# Patient Record
Sex: FEMALE | Race: WHITE | NOT HISPANIC OR LATINO | Employment: OTHER | URBAN - METROPOLITAN AREA
[De-identification: names, ages, dates, MRNs, and addresses within clinical notes are randomized per-mention and may not be internally consistent; named-entity substitution may affect disease eponyms.]

---

## 2017-09-27 ENCOUNTER — HOSPITAL ENCOUNTER (OUTPATIENT)
Dept: LAB | Facility: HOSPITAL | Age: 62
Setting detail: SPECIMEN
Discharge: HOME | End: 2017-09-27
Attending: INTERNAL MEDICINE | Admitting: INTERNAL MEDICINE

## 2017-10-04 ENCOUNTER — CONVERSION ENCOUNTER (OUTPATIENT)
Dept: PAIN MEDICINE | Facility: CLINIC | Age: 62
End: 2017-10-04

## 2017-11-10 ENCOUNTER — HOSPITAL ENCOUNTER (OUTPATIENT)
Dept: PAIN MEDICINE | Facility: HOSPITAL | Age: 62
Discharge: HOME | End: 2017-11-10
Attending: PHYSICAL MEDICINE & REHABILITATION | Admitting: PHYSICAL MEDICINE & REHABILITATION

## 2017-11-10 ENCOUNTER — CONVERSION ENCOUNTER (OUTPATIENT)
Dept: PAIN MEDICINE | Facility: CLINIC | Age: 62
End: 2017-11-10

## 2017-12-22 ENCOUNTER — HOSPITAL ENCOUNTER (OUTPATIENT)
Dept: PAIN MEDICINE | Facility: HOSPITAL | Age: 62
Discharge: HOME | End: 2017-12-22
Attending: PHYSICAL MEDICINE & REHABILITATION | Admitting: PHYSICAL MEDICINE & REHABILITATION

## 2017-12-22 ENCOUNTER — CONVERSION ENCOUNTER (OUTPATIENT)
Dept: PAIN MEDICINE | Facility: CLINIC | Age: 62
End: 2017-12-22

## 2018-01-05 ENCOUNTER — CONVERSION ENCOUNTER (OUTPATIENT)
Dept: PAIN MEDICINE | Facility: CLINIC | Age: 63
End: 2018-01-05

## 2018-01-05 ENCOUNTER — HOSPITAL ENCOUNTER (OUTPATIENT)
Dept: PAIN MEDICINE | Facility: HOSPITAL | Age: 63
Discharge: HOME | End: 2018-01-05
Attending: PHYSICAL MEDICINE & REHABILITATION | Admitting: PHYSICAL MEDICINE & REHABILITATION

## 2018-01-19 ENCOUNTER — HOSPITAL ENCOUNTER (OUTPATIENT)
Dept: PAIN MEDICINE | Facility: HOSPITAL | Age: 63
Discharge: HOME | End: 2018-01-19
Attending: PHYSICAL MEDICINE & REHABILITATION | Admitting: PHYSICAL MEDICINE & REHABILITATION

## 2018-01-19 ENCOUNTER — CONVERSION ENCOUNTER (OUTPATIENT)
Dept: PAIN MEDICINE | Facility: CLINIC | Age: 63
End: 2018-01-19

## 2018-09-26 ENCOUNTER — HOSPITAL ENCOUNTER (OUTPATIENT)
Dept: LAB | Facility: HOSPITAL | Age: 63
Setting detail: SPECIMEN
Discharge: HOME | End: 2018-09-26
Attending: INTERNAL MEDICINE | Admitting: INTERNAL MEDICINE

## 2018-10-03 ENCOUNTER — CONVERSION ENCOUNTER (OUTPATIENT)
Dept: PAIN MEDICINE | Facility: CLINIC | Age: 63
End: 2018-10-03

## 2019-06-04 VITALS
RESPIRATION RATE: 16 BRPM | WEIGHT: 150 LBS | DIASTOLIC BLOOD PRESSURE: 88 MMHG | DIASTOLIC BLOOD PRESSURE: 66 MMHG | HEART RATE: 102 BPM | OXYGEN SATURATION: 98 % | HEIGHT: 65 IN | RESPIRATION RATE: 16 BRPM | WEIGHT: 150 LBS | RESPIRATION RATE: 16 BRPM | DIASTOLIC BLOOD PRESSURE: 90 MMHG | SYSTOLIC BLOOD PRESSURE: 120 MMHG | HEART RATE: 92 BPM | BODY MASS INDEX: 24.99 KG/M2 | HEART RATE: 80 BPM | WEIGHT: 150 LBS | DIASTOLIC BLOOD PRESSURE: 93 MMHG | HEIGHT: 64 IN | BODY MASS INDEX: 24.99 KG/M2 | HEIGHT: 64 IN | SYSTOLIC BLOOD PRESSURE: 141 MMHG | HEIGHT: 65 IN | OXYGEN SATURATION: 97 % | RESPIRATION RATE: 16 BRPM | HEART RATE: 76 BPM | SYSTOLIC BLOOD PRESSURE: 138 MMHG | DIASTOLIC BLOOD PRESSURE: 78 MMHG | WEIGHT: 150 LBS | OXYGEN SATURATION: 96 % | WEIGHT: 150 LBS | SYSTOLIC BLOOD PRESSURE: 141 MMHG | HEART RATE: 93 BPM | OXYGEN SATURATION: 98 % | OXYGEN SATURATION: 96 % | OXYGEN SATURATION: 96 % | WEIGHT: 155 LBS | BODY MASS INDEX: 25.61 KG/M2 | HEART RATE: 97 BPM | BODY MASS INDEX: 26.46 KG/M2 | BODY MASS INDEX: 24.99 KG/M2 | HEIGHT: 65 IN | SYSTOLIC BLOOD PRESSURE: 150 MMHG | DIASTOLIC BLOOD PRESSURE: 96 MMHG | SYSTOLIC BLOOD PRESSURE: 130 MMHG

## 2019-07-17 ENCOUNTER — ON CAMPUS - OUTPATIENT (OUTPATIENT)
Dept: RURAL HOSPITAL 3 | Facility: HOSPITAL | Age: 64
End: 2019-07-17

## 2019-07-17 DIAGNOSIS — K57.30 DIVERTICULOSIS OF LARGE INTESTINE WITHOUT PERFORATION OR ABS: ICD-10-CM

## 2019-07-17 DIAGNOSIS — K64.1 SECOND DEGREE HEMORRHOIDS: ICD-10-CM

## 2019-07-17 DIAGNOSIS — Z86.010 PERSONAL HISTORY OF COLONIC POLYPS: ICD-10-CM

## 2019-07-17 DIAGNOSIS — D12.5 BENIGN NEOPLASM OF SIGMOID COLON: ICD-10-CM

## 2019-07-17 DIAGNOSIS — D12.0 BENIGN NEOPLASM OF CECUM: ICD-10-CM

## 2019-07-17 DIAGNOSIS — K63.5 POLYP OF COLON: ICD-10-CM

## 2019-07-17 PROCEDURE — 45385 COLONOSCOPY W/LESION REMOVAL: CPT | Mod: 33 | Performed by: INTERNAL MEDICINE

## 2019-07-17 PROCEDURE — 45380 COLONOSCOPY AND BIOPSY: CPT | Mod: 33,59 | Performed by: INTERNAL MEDICINE

## 2019-09-19 DIAGNOSIS — E89.0 HYPOTHYROIDISM, POSTSURGICAL: Primary | ICD-10-CM

## 2019-09-19 DIAGNOSIS — C73 CARCINOMA OF THYROID GLAND (HCC): ICD-10-CM

## 2019-09-19 PROBLEM — M47.817 OSTEOARTHRITIS OF LUMBOSACRAL SPINE WITHOUT MYELOPATHY: Status: ACTIVE | Noted: 2017-11-10

## 2019-09-19 PROBLEM — M54.17 LUMBOSACRAL RADICULOPATHY: Status: ACTIVE | Noted: 2017-11-10

## 2019-09-19 PROBLEM — G89.29 CHRONIC LOW BACK PAIN: Status: ACTIVE | Noted: 2017-11-10

## 2019-09-19 PROBLEM — M48.061 SPINAL STENOSIS OF LUMBAR REGION: Status: ACTIVE | Noted: 2018-01-19

## 2019-09-19 PROBLEM — M54.50 CHRONIC LOW BACK PAIN: Status: ACTIVE | Noted: 2017-11-10

## 2019-12-02 ENCOUNTER — LAB (OUTPATIENT)
Dept: LAB | Facility: HOSPITAL | Age: 64
End: 2019-12-02

## 2019-12-02 DIAGNOSIS — C73 CARCINOMA OF THYROID GLAND (HCC): ICD-10-CM

## 2019-12-02 DIAGNOSIS — E89.0 HYPOTHYROIDISM, POSTSURGICAL: ICD-10-CM

## 2019-12-02 LAB
T4 FREE SERPL-MCNC: 1.99 NG/DL (ref 0.93–1.7)
TSH SERPL DL<=0.05 MIU/L-ACNC: 0.46 UIU/ML (ref 0.27–4.2)

## 2019-12-02 PROCEDURE — 84439 ASSAY OF FREE THYROXINE: CPT

## 2019-12-02 PROCEDURE — 84443 ASSAY THYROID STIM HORMONE: CPT

## 2019-12-02 PROCEDURE — 36415 COLL VENOUS BLD VENIPUNCTURE: CPT

## 2019-12-03 RX ORDER — LEVOTHYROXINE SODIUM 0.12 MG/1
TABLET ORAL
Qty: 90 TABLET | Refills: 0 | Status: SHIPPED | OUTPATIENT
Start: 2019-12-03 | End: 2019-12-09

## 2019-12-09 ENCOUNTER — OFFICE VISIT (OUTPATIENT)
Dept: ENDOCRINOLOGY | Facility: CLINIC | Age: 64
End: 2019-12-09

## 2019-12-09 VITALS
OXYGEN SATURATION: 94 % | HEART RATE: 92 BPM | DIASTOLIC BLOOD PRESSURE: 86 MMHG | HEIGHT: 64 IN | SYSTOLIC BLOOD PRESSURE: 142 MMHG | WEIGHT: 144 LBS | BODY MASS INDEX: 24.59 KG/M2

## 2019-12-09 DIAGNOSIS — E55.9 VITAMIN D DEFICIENCY: ICD-10-CM

## 2019-12-09 DIAGNOSIS — L65.9 ALOPECIA: ICD-10-CM

## 2019-12-09 DIAGNOSIS — E89.0 HYPOTHYROIDISM, POSTSURGICAL: Primary | ICD-10-CM

## 2019-12-09 PROBLEM — M43.10 DEGENERATIVE SPONDYLOLISTHESIS: Status: ACTIVE | Noted: 2018-02-07

## 2019-12-09 PROBLEM — Z98.1 S/P LUMBAR FUSION: Status: ACTIVE | Noted: 2018-04-26

## 2019-12-09 PROCEDURE — 99213 OFFICE O/P EST LOW 20 MIN: CPT | Performed by: INTERNAL MEDICINE

## 2019-12-09 RX ORDER — VALSARTAN AND HYDROCHLOROTHIAZIDE 160; 12.5 MG/1; MG/1
1 TABLET, FILM COATED ORAL DAILY
Refills: 2 | COMMUNITY
Start: 2019-11-28

## 2019-12-09 RX ORDER — SPIRONOLACTONE 25 MG/1
25 TABLET ORAL 2 TIMES DAILY
Qty: 180 TABLET | Refills: 1 | Status: SHIPPED | OUTPATIENT
Start: 2019-12-09 | End: 2020-06-03

## 2019-12-09 RX ORDER — LEVOTHYROXINE SODIUM 0.12 MG/1
TABLET ORAL
Qty: 90 TABLET | Refills: 3 | Status: SHIPPED | OUTPATIENT
Start: 2019-12-09 | End: 2020-02-24

## 2019-12-09 RX ORDER — EZETIMIBE 10 MG/1
10 TABLET ORAL DAILY
Refills: 0 | COMMUNITY
Start: 2019-10-17 | End: 2022-01-04

## 2019-12-09 RX ORDER — CALCIUM CARBONATE 200(500)MG
1 TABLET,CHEWABLE ORAL DAILY
COMMUNITY
End: 2020-12-09 | Stop reason: SDUPTHER

## 2019-12-09 RX ORDER — FLUTICASONE PROPIONATE 50 MCG
SPRAY, SUSPENSION (ML) NASAL AS NEEDED
Refills: 3 | COMMUNITY
Start: 2019-10-07

## 2019-12-09 RX ORDER — CETIRIZINE HYDROCHLORIDE 10 MG/1
10 TABLET ORAL DAILY
COMMUNITY

## 2019-12-09 RX ORDER — OMEPRAZOLE 20 MG/1
CAPSULE, DELAYED RELEASE ORAL DAILY
COMMUNITY
Start: 2015-10-20

## 2019-12-09 RX ORDER — OLMESARTAN MEDOXOMIL AND HYDROCHLOROTHIAZIDE 40/12.5 40; 12.5 MG/1; MG/1
1 TABLET ORAL DAILY
COMMUNITY
End: 2020-12-09

## 2019-12-09 RX ORDER — TRAMADOL HYDROCHLORIDE 50 MG/1
50 TABLET ORAL AS NEEDED
Refills: 0 | COMMUNITY
Start: 2019-10-15

## 2019-12-09 RX ORDER — OLOPATADINE HYDROCHLORIDE 7 MG/ML
SOLUTION OPHTHALMIC DAILY
Refills: 11 | COMMUNITY
Start: 2019-11-01 | End: 2022-01-04

## 2019-12-09 RX ORDER — BIOTIN 2500 MCG
1 CAPSULE ORAL DAILY
COMMUNITY
Start: 2017-09-27

## 2019-12-09 RX ORDER — MULTIPLE VITAMINS W/ MINERALS TAB 2148-113
TAB ORAL DAILY
COMMUNITY
End: 2020-12-09 | Stop reason: SDUPTHER

## 2019-12-09 RX ORDER — MELOXICAM 15 MG/1
15 TABLET ORAL DAILY
Refills: 2 | COMMUNITY
Start: 2019-11-28

## 2019-12-09 RX ORDER — LINACLOTIDE 145 UG/1
290 CAPSULE, GELATIN COATED ORAL 2 TIMES DAILY
Refills: 3 | COMMUNITY
Start: 2019-11-17 | End: 2022-01-04

## 2019-12-09 RX ORDER — CYCLOBENZAPRINE HCL 10 MG
10 TABLET ORAL 2 TIMES DAILY
Refills: 4 | COMMUNITY
Start: 2019-11-25 | End: 2022-01-04

## 2019-12-09 NOTE — PATIENT INSTRUCTIONS
Continue exercise.  Decrease levothyroxine to one 6d / week.  Spironolactone 25 mg one daily.  If well tolerated, ater 1 week increase to 2 daily  Continue other meds.  F/u in 1y, with labs prior.

## 2019-12-09 NOTE — PROGRESS NOTES
[facility]        Patient Care Team:  Provider, No Known as PCP - General    Chief Complaint:    Chief Complaint   Patient presents with   • Thyroid Problem         Subjective     Here for thyroid f/u  Taking thyroid meds regularly  Decreasing food intake  Exercise program: walking & using the elliptical machine    Interval History:     Patient Complaints: hair thinning x 1y  Patient Denies:  Palpitations or bloating  History taken from: patient    Review of Systems:   Review of Systems   Eyes: Negative for blurred vision.   Gastrointestinal: Negative for nausea.   Endocrine: Negative for polyuria.   Neurological: Negative for headache.     Lost  11 lb since last visit  Objective     Vital Signs  Heart Rate:  [92] 92  BP: (142)/(86) 142/86    Physical Exam:     General Appearance:    Alert, cooperative, in no acute distress   Head:    Normocephalic. Thin hair, with bald spots   Eyes:       Mouth:  Neck:       No periorbital edema. No lid lag    No lesions    Scar from thyroidectomy, no masses or nodules   Lungs:     Clear     Heart:    Regular rhythm and normal rate   Abdomen:     Normal bowel sounds, soft                 Extremities:   Moves all extremities well, no edema or tremors               Pulses:   Pulses palpable and equal bilaterally   Skin:   No rash   Neurologic:  DTR 1+          Results Review:    I have reviewed the patient's new clinical results, labs & imaging.    Medication Review:   Prior to Admission medications    Medication Sig Start Date End Date Taking? Authorizing Provider   Biotin 2500 MCG capsule Take 1 capsule by mouth Daily. 9/27/17  Yes Evelin Simon MD   calcium carbonate (TUMS) 500 MG chewable tablet Chew 1 tablet Daily.   Yes Evelin Simon MD   cetirizine (zyrTEC) 10 MG tablet Take 10 mg by mouth Daily.   Yes Evelin Simon MD   cyclobenzaprine (FLEXERIL) 10 MG tablet Take 10 mg by mouth 2 (Two) Times a Day. 11/25/19  Yes Provider  MD Evelin   ezetimibe (ZETIA) 10 MG tablet Take 10 mg by mouth Daily. 10/17/19  Yes Evelin Simon MD   fluticasone (FLONASE) 50 MCG/ACT nasal spray As Needed. 10/7/19  Yes Evelin Simon MD   levothyroxine (SYNTHROID, LEVOTHROID) 125 MCG tablet Take one daily 6d / week 12/9/19  Yes Rama Mercer MD   LINZESS 145 MCG capsule capsule Take 145-290 mcg by mouth 2 (Two) Times a Day. 11/17/19  Yes Evelin Simon MD   meloxicam (MOBIC) 15 MG tablet Take 15 mg by mouth Daily. 11/28/19  Yes Evelin Simon MD   Multiple Vitamins-Minerals (MULTIVITAMIN, EYE VITAMIN,) tablet tablet Take  by mouth Daily.   Yes Evelin Simon MD   olmesartan-hydrochlorothiazide (BENICAR HCT) 40-12.5 MG per tablet Take 1 tablet by mouth Daily.   Yes Evelin Simon MD   omeprazole (priLOSEC) 20 MG capsule Daily. 10/20/15  Yes ProviderEvelin MD   PAZEO 0.7 % solution Daily. 11/1/19  Yes Evelin Simon MD   traMADol (ULTRAM) 50 MG tablet Take 50 mg by mouth As Needed. for pain 10/15/19  Yes Evelin Simon MD   valsartan-hydrochlorothiazide (DIOVAN-HCT) 160-12.5 MG per tablet Take 1 tablet by mouth Daily. 11/28/19  Yes ProviderEvelin MD   vitamin d (CVS D3) 125 MCG (5000 UT) capsule Daily. 10/20/15  Yes Evelin Simon MD   levothyroxine (SYNTHROID, LEVOTHROID) 125 MCG tablet Take one daily (NEED TO KEEP SCHEDULED APPOINTMENT IN DEC FOR FURTHER REFILLS)  Patient taking differently: Take one daily 12/3/19 12/9/19 Yes Rama Mercer MD   spironolactone (ALDACTONE) 25 MG tablet Take 1 tablet by mouth 2 (Two) Times a Day. 12/9/19   Rmaa Mercer MD       Lab Results (most recent)     None            No results found for: HGBA1C   Lab Results   Component Value Date    BUN 12 03/07/2018    CREATININE 0.6 (L) 03/07/2018    BCR 20.0 03/07/2018    K 3.6 03/07/2018    CO2 28 03/07/2018    CALCIUM 8.3 (L) 03/07/2018    ALBUMIN 3.2 (L) 03/07/2018    LABIL2 1.4  03/07/2018    AST 66 (H) 03/07/2018    ALT 60 03/07/2018     Lab Results   Component Value Date    TSH 0.462 12/02/2019    FREET4 1.99 (H) 12/02/2019       Assessment/Plan     Leslie was seen today for thyroid problem.    Diagnoses and all orders for this visit:    Hypothyroidism, postsurgical  -     TSH; Future  -     T4, Free; Future    Vitamin D deficiency  -     Vitamin D 25 Hydroxy; Future    Alopecia  -     Comprehensive Metabolic Panel; Future    Other orders  -     levothyroxine (SYNTHROID, LEVOTHROID) 125 MCG tablet; Take one daily 6d / week  -     spironolactone (ALDACTONE) 25 MG tablet; Take 1 tablet by mouth 2 (Two) Times a Day.        Continue exercise.  Decrease levothyroxine to one 6d / week.  Spironolactone 25 mg one daily.  If well tolerated, after 1 week increase to 2 daily. Let us know if it helps the hair.  Continue other meds.          Rama Mercer MD  12/09/19  7:00 PM

## 2020-02-24 RX ORDER — LEVOTHYROXINE SODIUM 0.12 MG/1
TABLET ORAL
Qty: 90 TABLET | Refills: 3 | Status: SHIPPED | OUTPATIENT
Start: 2020-02-24 | End: 2020-12-09 | Stop reason: SDUPTHER

## 2020-06-03 RX ORDER — SPIRONOLACTONE 25 MG/1
TABLET ORAL
Qty: 180 TABLET | Refills: 1 | Status: SHIPPED | OUTPATIENT
Start: 2020-06-03 | End: 2020-12-09

## 2020-12-02 ENCOUNTER — LAB (OUTPATIENT)
Dept: LAB | Facility: HOSPITAL | Age: 65
End: 2020-12-02

## 2020-12-02 DIAGNOSIS — E89.0 HYPOTHYROIDISM, POSTSURGICAL: ICD-10-CM

## 2020-12-02 DIAGNOSIS — E55.9 VITAMIN D DEFICIENCY: ICD-10-CM

## 2020-12-02 DIAGNOSIS — L65.9 ALOPECIA: ICD-10-CM

## 2020-12-02 LAB
25(OH)D3 SERPL-MCNC: 54.5 NG/ML (ref 30–100)
ALBUMIN SERPL-MCNC: 4.6 G/DL (ref 3.5–5.2)
ALBUMIN/GLOB SERPL: 1.8 G/DL
ALP SERPL-CCNC: 67 U/L (ref 39–117)
ALT SERPL W P-5'-P-CCNC: 40 U/L (ref 1–33)
ANION GAP SERPL CALCULATED.3IONS-SCNC: 11.4 MMOL/L (ref 5–15)
AST SERPL-CCNC: 35 U/L (ref 1–32)
BILIRUB SERPL-MCNC: 1.3 MG/DL (ref 0–1.2)
BUN SERPL-MCNC: 16 MG/DL (ref 8–23)
BUN/CREAT SERPL: 21.9 (ref 7–25)
CALCIUM SPEC-SCNC: 10.2 MG/DL (ref 8.6–10.5)
CHLORIDE SERPL-SCNC: 99 MMOL/L (ref 98–107)
CO2 SERPL-SCNC: 27.6 MMOL/L (ref 22–29)
CREAT SERPL-MCNC: 0.73 MG/DL (ref 0.57–1)
GFR SERPL CREATININE-BSD FRML MDRD: 80 ML/MIN/1.73
GLOBULIN UR ELPH-MCNC: 2.6 GM/DL
GLUCOSE SERPL-MCNC: 98 MG/DL (ref 65–99)
POTASSIUM SERPL-SCNC: 4.1 MMOL/L (ref 3.5–5.2)
PROT SERPL-MCNC: 7.2 G/DL (ref 6–8.5)
SODIUM SERPL-SCNC: 138 MMOL/L (ref 136–145)
T4 FREE SERPL-MCNC: 1.83 NG/DL (ref 0.93–1.7)
TSH SERPL DL<=0.05 MIU/L-ACNC: 1.35 UIU/ML (ref 0.27–4.2)

## 2020-12-02 PROCEDURE — 80053 COMPREHEN METABOLIC PANEL: CPT

## 2020-12-02 PROCEDURE — 82306 VITAMIN D 25 HYDROXY: CPT

## 2020-12-02 PROCEDURE — 84443 ASSAY THYROID STIM HORMONE: CPT

## 2020-12-02 PROCEDURE — 36415 COLL VENOUS BLD VENIPUNCTURE: CPT

## 2020-12-02 PROCEDURE — 84439 ASSAY OF FREE THYROXINE: CPT

## 2020-12-09 ENCOUNTER — OFFICE VISIT (OUTPATIENT)
Dept: ENDOCRINOLOGY | Facility: CLINIC | Age: 65
End: 2020-12-09

## 2020-12-09 VITALS
OXYGEN SATURATION: 95 % | TEMPERATURE: 97.8 F | WEIGHT: 143 LBS | HEART RATE: 93 BPM | DIASTOLIC BLOOD PRESSURE: 64 MMHG | SYSTOLIC BLOOD PRESSURE: 110 MMHG | HEIGHT: 64 IN | BODY MASS INDEX: 24.41 KG/M2

## 2020-12-09 DIAGNOSIS — E89.0 HYPOTHYROIDISM, POSTSURGICAL: Primary | ICD-10-CM

## 2020-12-09 DIAGNOSIS — L65.9 ALOPECIA: ICD-10-CM

## 2020-12-09 DIAGNOSIS — E55.9 VITAMIN D DEFICIENCY: ICD-10-CM

## 2020-12-09 PROCEDURE — 99214 OFFICE O/P EST MOD 30 MIN: CPT | Performed by: INTERNAL MEDICINE

## 2020-12-09 RX ORDER — SPIRONOLACTONE 100 MG/1
TABLET, FILM COATED ORAL
Qty: 90 TABLET | Refills: 3 | Status: SHIPPED | OUTPATIENT
Start: 2020-12-09 | End: 2021-01-14 | Stop reason: SDUPTHER

## 2020-12-09 RX ORDER — OMEPRAZOLE 20 MG/1
20 CAPSULE, DELAYED RELEASE ORAL
COMMUNITY
Start: 2020-12-01 | End: 2020-12-09 | Stop reason: SDUPTHER

## 2020-12-09 RX ORDER — MULTIPLE VITAMINS W/ MINERALS TAB 9MG-400MCG
TAB ORAL
COMMUNITY

## 2020-12-09 RX ORDER — LEVOTHYROXINE SODIUM 0.1 MG/1
TABLET ORAL
Qty: 90 TABLET | Refills: 3 | Status: SHIPPED | OUTPATIENT
Start: 2020-12-09 | End: 2021-11-29

## 2020-12-09 NOTE — PATIENT INSTRUCTIONS
Continue exercise.  Change levothyroxine to 100 mcg one daily, 1st thing in am, by itself.  Increase spironolactone to 3 tabs daily until finished.  Then take 100 mg one daily.  F/u in 1y, with labs prior.

## 2020-12-13 NOTE — PROGRESS NOTES
[facility]        Patient Care Team:  Provider, Skyla Known as PCP - General    Chief Complaint:    Chief Complaint   Patient presents with   • Hypothyroidism         Subjective     Here for thyroid f/u  Hair loss a little better  Exercise program: restarted elliptical machine this month  Taking vit D    Interval History:     Patient Complaints: had R toe surgery for overlapping in October  Patient Denies:  Palpitations   History taken from: patient    Review of Systems:   Review of Systems   HENT: Negative for trouble swallowing.    Eyes: Negative for blurred vision.   Cardiovascular: Negative for palpitations.   Gastrointestinal: Negative for nausea.   Endocrine: Negative for polyuria.   Neurological: Positive for tremors. Negative for headache.     Lost  1 lb since last visit  Objective     Vital Signs      Vitals:    12/09/20 1047   BP: 110/64   Pulse: 93   Temp: 97.8 °F (36.6 °C)   SpO2: 95%         Physical Exam:     General Appearance:    Alert, cooperative, in no acute distress   Head:    Normocephalic, thin hair with bald spots   Eyes:       Mouth:  Neck:       No periorbital edema. No lid lag    No lesions    Scar from thyroidectomy, no masses or nodules   Lungs:     Clear    Heart:    Regular rhythm and normal rate   Abdomen:     Normal bowel sounds, soft                 Extremities:   Wearing R boot, no edema. Hand tremors               Pulses:   Pulses palpable and equal bilaterally   Skin:   Dry   Neurologic:  DTR 1+          Results Review:    I have reviewed the patient's new clinical results, labs & imaging.    Medication Review:   Prior to Admission medications    Medication Sig Start Date End Date Taking? Authorizing Provider   Biotin 2500 MCG capsule Take 1 capsule by mouth Daily. 9/27/17  Yes Evelin Simon MD   Calcium Carbonate-Vitamin D (Calcium 500 + D) 500-125 MG-UNIT tablet    Yes Evelin Simon MD   cetirizine (zyrTEC) 10 MG tablet Take 10 mg by mouth  Daily.   Yes Evelin Simon MD   cyclobenzaprine (FLEXERIL) 10 MG tablet Take 10 mg by mouth 2 (Two) Times a Day. 11/25/19  Yes Evelin Simon MD   ezetimibe (ZETIA) 10 MG tablet Take 10 mg by mouth Daily. 10/17/19  Yes Evelin Simon MD   fluticasone (FLONASE) 50 MCG/ACT nasal spray As Needed. 10/7/19  Yes Evelin Simon MD   levothyroxine (SYNTHROID, LEVOTHROID) 100 MCG tablet TAKE 1 TABLET BY MOUTH daily 12/9/20  Yes Rama Mercer MD   LINZESS 145 MCG capsule capsule Take 290 mcg by mouth 2 (Two) Times a Day. 11/17/19  Yes Evelin Simon MD   meloxicam (MOBIC) 15 MG tablet Take 15 mg by mouth Daily. 11/28/19  Yes Evelin Simon MD   multivitamin with minerals (ONE-A-DAY WOMENS 50 PLUS PO)    Yes ProviderEvelin MD   omeprazole (priLOSEC) 20 MG capsule Daily. 10/20/15  Yes ProviderEvelin MD   PAZEO 0.7 % solution Daily. 11/1/19  Yes ProviderEvelin MD   traMADol (ULTRAM) 50 MG tablet Take 50 mg by mouth As Needed. for pain 10/15/19  Yes Evelin Simon MD   valsartan-hydrochlorothiazide (DIOVAN-HCT) 160-12.5 MG per tablet Take 1 tablet by mouth Daily. 11/28/19  Yes Evelin Simon MD   vitamin d (CVS D3) 125 MCG (5000 UT) capsule Daily. 10/20/15  Yes ProviderEvelin MD   Wheat Dextrin (BENEFIBER DRINK MIX PO)    Yes Provider, MD Evelin   Zinc 15 MG capsule Take  by mouth.   Yes Provider, MD Evelin   spironolactone (Aldactone) 100 MG tablet Take one daily 12/9/20   Rama Mercer MD       Lab Results (most recent)     None           Lab Results   Component Value Date    GLUCOSE 98 12/02/2020    BUN 16 12/02/2020    CREATININE 0.73 12/02/2020    EGFRIFNONA 80 12/02/2020    BCR 21.9 12/02/2020    K 4.1 12/02/2020    CO2 27.6 12/02/2020    CALCIUM 10.2 12/02/2020    ALBUMIN 4.60 12/02/2020    LABIL2 1.4 03/07/2018    AST 35 (H) 12/02/2020    ALT 40 (H) 12/02/2020     Lab Results   Component Value Date    TSH 1.350  12/02/2020    FREET4 1.83 (H) 12/02/2020     Vit D level 54.5    Assessment/Plan     Diagnoses and all orders for this visit:    1. Hypothyroidism, postsurgical (Primary)  -     levothyroxine (SYNTHROID, LEVOTHROID) 100 MCG tablet; TAKE 1 TABLET BY MOUTH daily  Dispense: 90 tablet; Refill: 3  -     TSH; Future  -     T4, Free; Future    2. Vitamin D deficiency  -     Vitamin D 25 Hydroxy; Future    3. Alopecia  -     spironolactone (Aldactone) 100 MG tablet; Take one daily  Dispense: 90 tablet; Refill: 3  -     Comprehensive Metabolic Panel; Future    Thyroid over treated, vit D stable, alopecia under treated.    Continue exercise.  Change levothyroxine to 100 mcg one daily, 1st thing in am, by itself.  Increase spironolactone to 3 tabs daily until finished.  Then take 100 mg one daily.        Rama Mercer MD  12/13/20  15:49 EST

## 2021-01-14 DIAGNOSIS — L65.9 ALOPECIA: ICD-10-CM

## 2021-01-14 RX ORDER — SPIRONOLACTONE 100 MG/1
TABLET, FILM COATED ORAL
Qty: 180 TABLET | Refills: 3 | Status: SHIPPED | OUTPATIENT
Start: 2021-01-14 | End: 2022-01-04 | Stop reason: SDUPTHER

## 2021-01-14 NOTE — TELEPHONE ENCOUNTER
Pt notified and new Rx entered. [place] is going [place] of [place] for two [place] and wants to get it filled before she leaves

## 2021-01-14 NOTE — TELEPHONE ENCOUNTER
Pt has not seen any new hair growth, would like to see about going up on the medication as discussed at last office visit. Message routed to Dr. Mercer.

## 2021-11-29 DIAGNOSIS — E89.0 HYPOTHYROIDISM, POSTSURGICAL: ICD-10-CM

## 2021-11-29 RX ORDER — LEVOTHYROXINE SODIUM 112 UG/1
TABLET ORAL
Qty: 90 TABLET | Refills: 3 | Status: SHIPPED | OUTPATIENT
Start: 2021-11-29 | End: 2022-11-21

## 2021-11-29 NOTE — TELEPHONE ENCOUNTER
Patient called for a refill on her Thyroid medication. She states she had labs done with Dr. Law in the last 2 months and they were off. I have called Dr. Law's office for those results to see if medication needs adjusted. Patient is about out of medication.

## 2021-12-01 ENCOUNTER — LAB (OUTPATIENT)
Dept: LAB | Facility: HOSPITAL | Age: 66
End: 2021-12-01

## 2021-12-01 DIAGNOSIS — E89.0 HYPOTHYROIDISM, POSTSURGICAL: ICD-10-CM

## 2021-12-01 DIAGNOSIS — L65.9 ALOPECIA: ICD-10-CM

## 2021-12-01 DIAGNOSIS — E55.9 VITAMIN D DEFICIENCY: ICD-10-CM

## 2021-12-01 LAB
25(OH)D3 SERPL-MCNC: 42.4 NG/ML
ALBUMIN SERPL-MCNC: 4.7 G/DL (ref 3.5–5.2)
ALBUMIN/GLOB SERPL: 2 G/DL
ALP SERPL-CCNC: 86 U/L (ref 39–117)
ALT SERPL W P-5'-P-CCNC: 52 U/L (ref 1–33)
ANION GAP SERPL CALCULATED.3IONS-SCNC: 8.1 MMOL/L (ref 5–15)
AST SERPL-CCNC: 44 U/L (ref 1–32)
BILIRUB SERPL-MCNC: 0.7 MG/DL (ref 0–1.2)
BUN SERPL-MCNC: 17 MG/DL (ref 8–23)
BUN/CREAT SERPL: 15.9 (ref 7–25)
CALCIUM SPEC-SCNC: 10 MG/DL (ref 8.6–10.5)
CHLORIDE SERPL-SCNC: 102 MMOL/L (ref 98–107)
CO2 SERPL-SCNC: 27.9 MMOL/L (ref 22–29)
CREAT SERPL-MCNC: 1.07 MG/DL (ref 0.57–1)
GFR SERPL CREATININE-BSD FRML MDRD: 51 ML/MIN/1.73
GLOBULIN UR ELPH-MCNC: 2.4 GM/DL
GLUCOSE SERPL-MCNC: 99 MG/DL (ref 65–99)
POTASSIUM SERPL-SCNC: 3.9 MMOL/L (ref 3.5–5.2)
PROT SERPL-MCNC: 7.1 G/DL (ref 6–8.5)
SODIUM SERPL-SCNC: 138 MMOL/L (ref 136–145)
T4 FREE SERPL-MCNC: 1.91 NG/DL (ref 0.93–1.7)
TSH SERPL DL<=0.05 MIU/L-ACNC: 4.48 UIU/ML (ref 0.27–4.2)

## 2021-12-01 PROCEDURE — 84439 ASSAY OF FREE THYROXINE: CPT

## 2021-12-01 PROCEDURE — 82306 VITAMIN D 25 HYDROXY: CPT

## 2021-12-01 PROCEDURE — 36415 COLL VENOUS BLD VENIPUNCTURE: CPT

## 2021-12-01 PROCEDURE — 84443 ASSAY THYROID STIM HORMONE: CPT

## 2021-12-01 PROCEDURE — 80053 COMPREHEN METABOLIC PANEL: CPT

## 2021-12-03 RX ORDER — IBUPROFEN 800 MG/1
TABLET ORAL
COMMUNITY
Start: 2021-11-02 | End: 2023-01-04

## 2021-12-03 RX ORDER — TIZANIDINE 2 MG/1
TABLET ORAL
COMMUNITY
Start: 2021-10-27

## 2021-12-03 RX ORDER — LINACLOTIDE 290 UG/1
CAPSULE, GELATIN COATED ORAL
COMMUNITY
Start: 2021-11-10

## 2021-12-03 RX ORDER — LACTULOSE 10 G/15ML
SOLUTION ORAL
COMMUNITY
Start: 2021-10-22 | End: 2023-01-04

## 2021-12-03 RX ORDER — ROSUVASTATIN CALCIUM 10 MG/1
TABLET, COATED ORAL
COMMUNITY
Start: 2021-10-27

## 2022-01-04 ENCOUNTER — LAB (OUTPATIENT)
Dept: LAB | Facility: HOSPITAL | Age: 67
End: 2022-01-04

## 2022-01-04 ENCOUNTER — OFFICE VISIT (OUTPATIENT)
Dept: ENDOCRINOLOGY | Facility: CLINIC | Age: 67
End: 2022-01-04

## 2022-01-04 VITALS
TEMPERATURE: 98.2 F | HEART RATE: 68 BPM | BODY MASS INDEX: 26.15 KG/M2 | WEIGHT: 153.2 LBS | SYSTOLIC BLOOD PRESSURE: 100 MMHG | HEIGHT: 64 IN | DIASTOLIC BLOOD PRESSURE: 78 MMHG

## 2022-01-04 DIAGNOSIS — L65.9 ALOPECIA: ICD-10-CM

## 2022-01-04 DIAGNOSIS — E89.0 HYPOTHYROIDISM, POSTSURGICAL: Primary | ICD-10-CM

## 2022-01-04 DIAGNOSIS — E55.9 VITAMIN D DEFICIENCY: ICD-10-CM

## 2022-01-04 LAB
T4 FREE SERPL-MCNC: 1.81 NG/DL (ref 0.93–1.7)
TSH SERPL DL<=0.05 MIU/L-ACNC: 5.14 UIU/ML (ref 0.27–4.2)

## 2022-01-04 PROCEDURE — 84443 ASSAY THYROID STIM HORMONE: CPT | Performed by: INTERNAL MEDICINE

## 2022-01-04 PROCEDURE — 99214 OFFICE O/P EST MOD 30 MIN: CPT | Performed by: INTERNAL MEDICINE

## 2022-01-04 PROCEDURE — 84439 ASSAY OF FREE THYROXINE: CPT | Performed by: INTERNAL MEDICINE

## 2022-01-04 PROCEDURE — 36415 COLL VENOUS BLD VENIPUNCTURE: CPT | Performed by: INTERNAL MEDICINE

## 2022-01-04 RX ORDER — SPIRONOLACTONE 100 MG/1
TABLET, FILM COATED ORAL
Qty: 180 TABLET | Refills: 3 | Status: SHIPPED | OUTPATIENT
Start: 2022-01-04 | End: 2022-12-27

## 2022-01-04 NOTE — PATIENT INSTRUCTIONS
Labs drawn today.  Will call you with the lab results.  Continue exercise.  Continue thyroid med, spironolactone.  Take vit D supplements daily.  F/u in 1y, with labs prior.

## 2022-01-06 NOTE — PROGRESS NOTES
[facility]        Patient Care Team:  Maya Law MD as PCP - General (Family Medicine)    Chief Complaint:    Chief Complaint   Patient presents with   • Hypothyroidism     follow up         Subjective     Here for thyroid f/u  Thyroid dose increased in November due to abnormal levels  Hair loss improved with spironolactone  Exercise program: walking some  Taking vit D    Interval History:     Patient Complaints: father  recently  Patient Denies:  Palpitations  History taken from: patient    Review of Systems:   Review of Systems   HENT: Negative for trouble swallowing.    Eyes: Negative for blurred vision.   Cardiovascular: Negative for palpitations.   Gastrointestinal: Negative for nausea.   Endocrine: Negative for polyuria.   Neurological: Negative for tremors and headache.   Psychiatric/Behavioral: Positive for stress.     Gained 10 lb since last visit  Objective     Vital Signs      Vitals:    22 0856   BP: 100/78   Pulse: 68   Temp: 98.2 °F (36.8 °C)         Physical Exam:     General Appearance:    Alert, cooperative, in no acute distress   Head:    Normocephalic, without obvious abnormality, atraumatic   Eyes:       Mouth:  Neck:       No periorbital edema. No lid lag    No lesions    Scar from thyroidectomy   Lungs:     Clear     Heart:    Regular rhythm and normal rate   Abdomen:     Normal bowel sounds, soft                 Extremities:   Moves all extremities well, no edema or tremors               Pulses:   Pulses palpable and equal bilaterally   Skin:   Dry   Neurologic:  DTR 1+          Results Review:    I have reviewed the patient's new clinical results, labs & imaging.    Medication Review:   Prior to Admission medications    Medication Sig Start Date End Date Taking? Authorizing Provider   Biotin 2500 MCG capsule Take 1 capsule by mouth Daily. 17  Yes Provider, MD Evelin   Calcium Carbonate-Vitamin D (Calcium 500 + D) 500-125 MG-UNIT tablet    Yes  Evelin Simon MD   cetirizine (zyrTEC) 10 MG tablet Take 10 mg by mouth Daily.   Yes Evelin Simon MD   fluticasone (FLONASE) 50 MCG/ACT nasal spray As Needed. 10/7/19  Yes Evelin Simon MD   ibuprofen (ADVIL,MOTRIN) 800 MG tablet  11/2/21  Yes Evelin Simon MD   lactulose (CHRONULAC) 10 GM/15ML solution  10/22/21  Yes Evelin Simon MD   levothyroxine (Euthyrox) 112 MCG tablet Take one daily. 11/29/21  Yes Rama Mercer MD   Linzess 290 MCG capsule capsule  11/10/21  Yes Eveiln Simon MD   meloxicam (MOBIC) 15 MG tablet Take 15 mg by mouth Daily. 11/28/19  Yes Evelin Simon MD   multivitamin with minerals (ONE-A-DAY WOMENS 50 PLUS PO)    Yes Evelin Simon MD   omeprazole (priLOSEC) 20 MG capsule Daily. 10/20/15  Yes Evelin Simon MD   rosuvastatin (CRESTOR) 10 MG tablet  10/27/21  Yes ProviderEvelin MD   spironolactone (Aldactone) 100 MG tablet Take two daily 1/4/22  Yes Rama Mercer MD   tiZANidine (ZANAFLEX) 2 MG tablet  10/27/21  Yes Evelin Simon MD   traMADol (ULTRAM) 50 MG tablet Take 50 mg by mouth As Needed. for pain 10/15/19  Yes Evelin Simon MD   valsartan-hydrochlorothiazide (DIOVAN-HCT) 160-12.5 MG per tablet Take 1 tablet by mouth Daily. 11/28/19  Yes Evelin Simon MD   vitamin d (CVS D3) 125 MCG (5000 UT) capsule Daily. 10/20/15  Yes Evelin Simon MD   Wheat Dextrin (BENEFIBER DRINK MIX PO)    Yes Evelin Simon MD   Zinc 15 MG capsule Take  by mouth.   Yes Evelin Simon MD       Lab Results (most recent)     Procedure Component Value Units Date/Time    TSH [[id number]]  (Abnormal) Collected: 01/04/22 0934    Specimen: Blood Updated: 01/04/22 2144     TSH 5.140 uIU/mL     T4, Free [[id number]]  (Abnormal) Collected: 01/04/22 0934    Specimen: Blood Updated: 01/04/22 2144     Free T4 1.81 ng/dL     Narrative:      Results may be falsely increased if patient taking  Biotin.            TSH 9.32, FreeT4 1.29; vit D level 37.3 on 10/22/2021.     Lab Results   Component Value Date    GLUCOSE 99 12/01/2021    BUN 17 12/01/2021    CREATININE 1.07 (H) 12/01/2021    EGFRIFNONA 51 (L) 12/01/2021    BCR 15.9 12/01/2021    K 3.9 12/01/2021    CO2 27.9 12/01/2021    CALCIUM 10.0 12/01/2021    ALBUMIN 4.70 12/01/2021    LABIL2 1.4 03/07/2018    AST 44 (H) 12/01/2021    ALT 52 (H) 12/01/2021     Lab Results   Component Value Date    TSH 5.140 (H) 01/04/2022    FREET4 1.81 (H) 01/04/2022       Assessment/Plan     Diagnoses and all orders for this visit:    1. Hypothyroidism, postsurgical (Primary)  -     TSH  -     T4, Free    2. Vitamin D deficiency    3. Alopecia  -     spironolactone (Aldactone) 100 MG tablet; Take two daily  Dispense: 180 tablet; Refill: 3    Thyroid & vit D improved.    Continue exercise.  Continue thyroid med & spironolactone.  Take vit D supplements daily.        Rama Mercer MD  01/05/22  19:49 EST

## 2022-11-21 DIAGNOSIS — E89.0 HYPOTHYROIDISM, POSTSURGICAL: ICD-10-CM

## 2022-11-21 RX ORDER — LEVOTHYROXINE SODIUM 112 UG/1
TABLET ORAL
Qty: 90 TABLET | Refills: 0 | Status: SHIPPED | OUTPATIENT
Start: 2022-11-21 | End: 2023-01-04 | Stop reason: SDUPTHER

## 2022-12-23 DIAGNOSIS — L65.9 ALOPECIA: ICD-10-CM

## 2022-12-27 ENCOUNTER — LAB (OUTPATIENT)
Dept: LAB | Facility: HOSPITAL | Age: 67
End: 2022-12-27

## 2022-12-27 DIAGNOSIS — L65.9 ALOPECIA: ICD-10-CM

## 2022-12-27 DIAGNOSIS — E55.9 VITAMIN D DEFICIENCY: ICD-10-CM

## 2022-12-27 DIAGNOSIS — E89.0 HYPOTHYROIDISM, POSTSURGICAL: Primary | ICD-10-CM

## 2022-12-27 LAB
25(OH)D3 SERPL-MCNC: 55.7 NG/ML (ref 30–100)
ALBUMIN SERPL-MCNC: 4.5 G/DL (ref 3.5–5.2)
ALBUMIN/GLOB SERPL: 1.9 G/DL
ALP SERPL-CCNC: 76 U/L (ref 39–117)
ALT SERPL W P-5'-P-CCNC: 18 U/L (ref 1–33)
ANION GAP SERPL CALCULATED.3IONS-SCNC: 12.2 MMOL/L (ref 5–15)
AST SERPL-CCNC: 21 U/L (ref 1–32)
BILIRUB SERPL-MCNC: 1 MG/DL (ref 0–1.2)
BUN SERPL-MCNC: 26 MG/DL (ref 8–23)
BUN/CREAT SERPL: 24.5 (ref 7–25)
CALCIUM SPEC-SCNC: 10.2 MG/DL (ref 8.6–10.5)
CHLORIDE SERPL-SCNC: 99 MMOL/L (ref 98–107)
CO2 SERPL-SCNC: 26.8 MMOL/L (ref 22–29)
CREAT SERPL-MCNC: 1.06 MG/DL (ref 0.57–1)
EGFRCR SERPLBLD CKD-EPI 2021: 57.7 ML/MIN/1.73
GLOBULIN UR ELPH-MCNC: 2.4 GM/DL
GLUCOSE SERPL-MCNC: 84 MG/DL (ref 65–99)
POTASSIUM SERPL-SCNC: 4.4 MMOL/L (ref 3.5–5.2)
PROT SERPL-MCNC: 6.9 G/DL (ref 6–8.5)
SODIUM SERPL-SCNC: 138 MMOL/L (ref 136–145)
T4 FREE SERPL-MCNC: 1.81 NG/DL (ref 0.93–1.7)
TSH SERPL DL<=0.05 MIU/L-ACNC: 1.74 UIU/ML (ref 0.27–4.2)

## 2022-12-27 PROCEDURE — 80053 COMPREHEN METABOLIC PANEL: CPT

## 2022-12-27 PROCEDURE — 84443 ASSAY THYROID STIM HORMONE: CPT

## 2022-12-27 PROCEDURE — 82306 VITAMIN D 25 HYDROXY: CPT

## 2022-12-27 PROCEDURE — 84439 ASSAY OF FREE THYROXINE: CPT

## 2022-12-27 PROCEDURE — 36415 COLL VENOUS BLD VENIPUNCTURE: CPT

## 2022-12-27 RX ORDER — SPIRONOLACTONE 100 MG/1
TABLET, FILM COATED ORAL
Qty: 180 TABLET | Refills: 0 | Status: SHIPPED | OUTPATIENT
Start: 2022-12-27 | End: 2022-12-28

## 2022-12-28 DIAGNOSIS — L65.9 ALOPECIA: ICD-10-CM

## 2022-12-28 RX ORDER — SPIRONOLACTONE 100 MG/1
TABLET, FILM COATED ORAL
Qty: 180 TABLET | Refills: 0 | Status: SHIPPED | OUTPATIENT
Start: 2022-12-28 | End: 2023-01-04 | Stop reason: SDUPTHER

## 2023-01-04 ENCOUNTER — OFFICE VISIT (OUTPATIENT)
Dept: ENDOCRINOLOGY | Facility: CLINIC | Age: 68
End: 2023-01-04
Payer: MEDICARE

## 2023-01-04 VITALS
SYSTOLIC BLOOD PRESSURE: 114 MMHG | DIASTOLIC BLOOD PRESSURE: 74 MMHG | WEIGHT: 155.4 LBS | BODY MASS INDEX: 26.53 KG/M2 | HEART RATE: 70 BPM | HEIGHT: 64 IN

## 2023-01-04 DIAGNOSIS — E89.0 HYPOTHYROIDISM, POSTSURGICAL: Primary | ICD-10-CM

## 2023-01-04 DIAGNOSIS — L65.9 ALOPECIA: ICD-10-CM

## 2023-01-04 DIAGNOSIS — E55.9 VITAMIN D DEFICIENCY: ICD-10-CM

## 2023-01-04 PROCEDURE — 99214 OFFICE O/P EST MOD 30 MIN: CPT | Performed by: INTERNAL MEDICINE

## 2023-01-04 RX ORDER — CHLORHEXIDINE GLUCONATE 0.12 MG/ML
RINSE ORAL
COMMUNITY
Start: 2022-12-05 | End: 2023-01-04

## 2023-01-04 RX ORDER — IBUPROFEN 600 MG/1
600 TABLET ORAL EVERY 6 HOURS PRN
COMMUNITY
Start: 2022-12-05 | End: 2023-01-04

## 2023-01-04 RX ORDER — SPIRONOLACTONE 100 MG/1
200 TABLET, FILM COATED ORAL DAILY
Qty: 180 TABLET | Refills: 3 | Status: SHIPPED | OUTPATIENT
Start: 2023-01-04

## 2023-01-04 RX ORDER — LEVOTHYROXINE SODIUM 112 UG/1
TABLET ORAL
Qty: 90 TABLET | Refills: 3 | Status: SHIPPED | OUTPATIENT
Start: 2023-01-04

## 2023-01-04 RX ORDER — HYDROCODONE BITARTRATE AND ACETAMINOPHEN 5; 325 MG/1; MG/1
TABLET ORAL SEE ADMIN INSTRUCTIONS
COMMUNITY
Start: 2022-12-05

## 2023-01-04 NOTE — PATIENT INSTRUCTIONS
Increase exercise as planned.  Continue levothyroxine, spironolactone & vit D supplements.  F/u in 1y, with labs prior.

## 2023-01-05 NOTE — PROGRESS NOTES
[facility]        Patient Care Team:  Maya Law MD as PCP - General (Family Medicine)    Chief Complaint:    Chief Complaint   Patient presents with   • Hypothyroidism         Subjective     Here for thyroid f/u  Spironolactone is helping decrease hair loss  Exercise program: walking some  Taking vit D    Interval History:     Patient Complaints: none  Patient Denies:  Palpitations   History taken from: patient    Review of Systems:   Review of Systems   HENT: Negative for trouble swallowing.    Eyes: Negative for blurred vision.   Cardiovascular: Negative for palpitations.   Gastrointestinal: Negative for nausea.   Endocrine: Negative for polyuria.   Neurological: Positive for tremors. Negative for headache.     Gained 2 lb since last visit  Objective     Vital Signs      Vitals:    01/04/23 0855   BP: 114/74   Pulse: 70         Physical Exam:     General Appearance:    Alert, cooperative, in no acute distress   Head:    Normocephalic, without obvious abnormality, atraumatic   Eyes:       Mouth:  Neck:       No periorbital edema. No lid lag    No lesions    Scar from thyroidectomy   Lungs:     Clear    Heart:    Regular rhythm and normal rate   Abdomen:     Normal bowel sounds, soft                 Extremities:   Fine hand tremors (unchanged), no edema               Pulses:   Pulses palpable and equal bilaterally   Skin:   Dry   Neurologic:  DTR 1+          Results Review:    I have reviewed the patient's new clinical results, labs & imaging.    Medication Review:   Prior to Admission medications    Medication Sig Start Date End Date Taking? Authorizing Provider   Biotin 2500 MCG capsule Take 1 capsule by mouth Daily. 9/27/17  Yes Evelin Simon MD   Calcium Carbonate-Vitamin D (Calcium 500 + D) 500-125 MG-UNIT tablet    Yes Evelin Simon MD   cetirizine (zyrTEC) 10 MG tablet Take 10 mg by mouth Daily.   Yes Evelin Simon MD   fluticasone (FLONASE) 50 MCG/ACT  nasal spray As Needed. 10/7/19  Yes Evelin Simon MD   HYDROcodone-acetaminophen (NORCO) 5-325 MG per tablet Take  by mouth See Admin Instructions. Take 1 tablet by mouth every 4-6 hours as needed for pain 12/5/22  Yes Evelin Simon MD   levothyroxine (SYNTHROID, LEVOTHROID) 112 MCG tablet Take 1 tablet by mouth once daily 1/4/23  Yes Rama Mercer MD   Linzess 290 MCG capsule capsule  11/10/21  Yes Evelin Simon MD   meloxicam (MOBIC) 15 MG tablet Take 15 mg by mouth Daily. 11/28/19  Yes Evelin Simon MD   multivitamin with minerals tablet tablet    Yes Evelin Simon MD   omeprazole (priLOSEC) 20 MG capsule Daily. 10/20/15  Yes Evelin Simon MD   rosuvastatin (CRESTOR) 10 MG tablet  10/27/21  Yes Evelin Simon MD   spironolactone (ALDACTONE) 100 MG tablet Take 2 tablets by mouth Daily. 1/4/23  Yes Rama Mercer MD   tiZANidine (ZANAFLEX) 2 MG tablet  10/27/21  Yes ProviderEvelin MD   traMADol (ULTRAM) 50 MG tablet Take 50 mg by mouth As Needed. for pain 10/15/19  Yes Evelin Simon MD   valsartan-hydrochlorothiazide (DIOVAN-HCT) 160-12.5 MG per tablet Take 1 tablet by mouth Daily. 11/28/19  Yes Evelin Simon MD   vitamin D3 (vitamin d) 125 MCG (5000 UT) capsule capsule Daily. 10/20/15  Yes ProviderEvelin MD   Wheat Dextrin (BENEFIBER DRINK MIX PO)    Yes ProviderEvelin MD   Zinc 15 MG capsule Take  by mouth.   Yes Provider, MD Evelin       Lab Results (most recent)     None         Lab Results   Component Value Date    GLUCOSE 84 12/27/2022    BUN 26 (H) 12/27/2022    CREATININE 1.06 (H) 12/27/2022    EGFRIFNONA 51 (L) 12/01/2021    BCR 24.5 12/27/2022    K 4.4 12/27/2022    CO2 26.8 12/27/2022    CALCIUM 10.2 12/27/2022    ALBUMIN 4.5 12/27/2022    LABIL2 1.4 03/07/2018    AST 21 12/27/2022    ALT 18 12/27/2022     Lab Results   Component Value Date    TSH 1.740 12/27/2022    FREET4 1.81 (H) 12/27/2022      Vit D level 55.7    Assessment & Plan     Diagnoses and all orders for this visit:    1. Hypothyroidism, postsurgical (Primary)  -     levothyroxine (SYNTHROID, LEVOTHROID) 112 MCG tablet; Take 1 tablet by mouth once daily  Dispense: 90 tablet; Refill: 3  -     TSH; Future  -     T4, Free; Future    2. Alopecia  -     spironolactone (ALDACTONE) 100 MG tablet; Take 2 tablets by mouth Daily.  Dispense: 180 tablet; Refill: 3  -     Comprehensive Metabolic Panel; Future    3. Vitamin D deficiency  -     Vitamin D,25-Hydroxy; Future    Thyroid & alopecia improved. Vit D stable.    Increase exercise as planned.  Continue levothyroxine, spironolactone & vit D supplements.        Rama Mercer MD  01/05/23  16:56 EST

## 2023-06-15 ENCOUNTER — HOSPITAL ENCOUNTER (OUTPATIENT)
Dept: CARDIOLOGY | Facility: HOSPITAL | Age: 68
Discharge: HOME | End: 2023-06-15
Payer: MEDICARE

## 2023-06-15 ENCOUNTER — LAB (OUTPATIENT)
Dept: LAB | Facility: HOSPITAL | Age: 68
End: 2023-06-15
Payer: MEDICARE

## 2023-06-15 ENCOUNTER — TRANSCRIBE ORDERS (OUTPATIENT)
Dept: ADMINISTRATIVE | Facility: HOSPITAL | Age: 68
End: 2023-06-15
Payer: MEDICARE

## 2023-06-15 DIAGNOSIS — M96.0 PSEUDARTHROSIS AFTER FUSION OR ARTHRODESIS: Primary | ICD-10-CM

## 2023-06-15 DIAGNOSIS — Z01.818 PRE-OP TESTING: ICD-10-CM

## 2023-06-15 DIAGNOSIS — M96.0 PSEUDARTHROSIS AFTER FUSION OR ARTHRODESIS: ICD-10-CM

## 2023-06-15 LAB
ALBUMIN SERPL-MCNC: 4.4 G/DL (ref 3.5–5.2)
ALBUMIN/GLOB SERPL: 1.9 G/DL
ALP SERPL-CCNC: 83 U/L (ref 39–117)
ALT SERPL W P-5'-P-CCNC: 23 U/L (ref 1–33)
ANION GAP SERPL CALCULATED.3IONS-SCNC: 8 MMOL/L (ref 5–15)
AST SERPL-CCNC: 22 U/L (ref 1–32)
BASOPHILS # BLD AUTO: 0.04 10*3/MM3 (ref 0–0.2)
BASOPHILS NFR BLD AUTO: 0.8 % (ref 0–1.5)
BILIRUB SERPL-MCNC: 0.9 MG/DL (ref 0–1.2)
BUN SERPL-MCNC: 19 MG/DL (ref 8–23)
BUN/CREAT SERPL: 17.4 (ref 7–25)
CALCIUM SPEC-SCNC: 9.5 MG/DL (ref 8.6–10.5)
CHLORIDE SERPL-SCNC: 102 MMOL/L (ref 98–107)
CO2 SERPL-SCNC: 28 MMOL/L (ref 22–29)
CREAT SERPL-MCNC: 1.09 MG/DL (ref 0.57–1)
DEPRECATED RDW RBC AUTO: 39.4 FL (ref 37–54)
EGFRCR SERPLBLD CKD-EPI 2021: 55.4 ML/MIN/1.73
EOSINOPHIL # BLD AUTO: 0.09 10*3/MM3 (ref 0–0.4)
EOSINOPHIL NFR BLD AUTO: 1.8 % (ref 0.3–6.2)
ERYTHROCYTE [DISTWIDTH] IN BLOOD BY AUTOMATED COUNT: 12.2 % (ref 12.3–15.4)
GLOBULIN UR ELPH-MCNC: 2.3 GM/DL
GLUCOSE SERPL-MCNC: 99 MG/DL (ref 65–99)
HCT VFR BLD AUTO: 44.3 % (ref 34–46.6)
HGB BLD-MCNC: 14.9 G/DL (ref 12–15.9)
IMM GRANULOCYTES # BLD AUTO: 0.02 10*3/MM3 (ref 0–0.05)
IMM GRANULOCYTES NFR BLD AUTO: 0.4 % (ref 0–0.5)
LYMPHOCYTES # BLD AUTO: 1.03 10*3/MM3 (ref 0.7–3.1)
LYMPHOCYTES NFR BLD AUTO: 21.1 % (ref 19.6–45.3)
MCH RBC QN AUTO: 29.6 PG (ref 26.6–33)
MCHC RBC AUTO-ENTMCNC: 33.6 G/DL (ref 31.5–35.7)
MCV RBC AUTO: 88.1 FL (ref 79–97)
MONOCYTES # BLD AUTO: 0.41 10*3/MM3 (ref 0.1–0.9)
MONOCYTES NFR BLD AUTO: 8.4 % (ref 5–12)
NEUTROPHILS NFR BLD AUTO: 3.29 10*3/MM3 (ref 1.7–7)
NEUTROPHILS NFR BLD AUTO: 67.5 % (ref 42.7–76)
NRBC BLD AUTO-RTO: 0 /100 WBC (ref 0–0.2)
PLATELET # BLD AUTO: 244 10*3/MM3 (ref 140–450)
PMV BLD AUTO: 9.9 FL (ref 6–12)
POTASSIUM SERPL-SCNC: 4.7 MMOL/L (ref 3.5–5.2)
PROT SERPL-MCNC: 6.7 G/DL (ref 6–8.5)
QT INTERVAL: 362 MS
RBC # BLD AUTO: 5.03 10*6/MM3 (ref 3.77–5.28)
SODIUM SERPL-SCNC: 138 MMOL/L (ref 136–145)
WBC NRBC COR # BLD: 4.88 10*3/MM3 (ref 3.4–10.8)

## 2023-06-15 PROCEDURE — 36415 COLL VENOUS BLD VENIPUNCTURE: CPT

## 2023-06-15 PROCEDURE — 85025 COMPLETE CBC W/AUTO DIFF WBC: CPT

## 2023-06-15 PROCEDURE — 80053 COMPREHEN METABOLIC PANEL: CPT

## 2023-06-15 PROCEDURE — 93005 ELECTROCARDIOGRAM TRACING: CPT | Performed by: ORTHOPAEDIC SURGERY

## 2023-07-11 LAB — QT INTERVAL: 362 MS

## 2023-12-27 ENCOUNTER — LAB (OUTPATIENT)
Dept: LAB | Facility: HOSPITAL | Age: 68
End: 2023-12-27
Payer: MEDICARE

## 2023-12-27 DIAGNOSIS — L65.9 ALOPECIA: ICD-10-CM

## 2023-12-27 DIAGNOSIS — E89.0 HYPOTHYROIDISM, POSTSURGICAL: ICD-10-CM

## 2023-12-27 DIAGNOSIS — E55.9 VITAMIN D DEFICIENCY: ICD-10-CM

## 2023-12-27 LAB
25(OH)D3 SERPL-MCNC: 48.2 NG/ML (ref 30–100)
ALBUMIN SERPL-MCNC: 4.6 G/DL (ref 3.5–5.2)
ALBUMIN/GLOB SERPL: 1.8 G/DL
ALP SERPL-CCNC: 75 U/L (ref 39–117)
ALT SERPL W P-5'-P-CCNC: 25 U/L (ref 1–33)
ANION GAP SERPL CALCULATED.3IONS-SCNC: 10.8 MMOL/L (ref 5–15)
AST SERPL-CCNC: 26 U/L (ref 1–32)
BILIRUB SERPL-MCNC: 1.1 MG/DL (ref 0–1.2)
BUN SERPL-MCNC: 18 MG/DL (ref 8–23)
BUN/CREAT SERPL: 17.1 (ref 7–25)
CALCIUM SPEC-SCNC: 10 MG/DL (ref 8.6–10.5)
CHLORIDE SERPL-SCNC: 102 MMOL/L (ref 98–107)
CO2 SERPL-SCNC: 24.2 MMOL/L (ref 22–29)
CREAT SERPL-MCNC: 1.05 MG/DL (ref 0.57–1)
EGFRCR SERPLBLD CKD-EPI 2021: 58 ML/MIN/1.73
GLOBULIN UR ELPH-MCNC: 2.5 GM/DL
GLUCOSE SERPL-MCNC: 90 MG/DL (ref 65–99)
POTASSIUM SERPL-SCNC: 4.3 MMOL/L (ref 3.5–5.2)
PROT SERPL-MCNC: 7.1 G/DL (ref 6–8.5)
SODIUM SERPL-SCNC: 137 MMOL/L (ref 136–145)
T4 FREE SERPL-MCNC: 2.29 NG/DL (ref 0.93–1.7)
TSH SERPL DL<=0.05 MIU/L-ACNC: 3.8 UIU/ML (ref 0.27–4.2)

## 2023-12-27 PROCEDURE — 36415 COLL VENOUS BLD VENIPUNCTURE: CPT

## 2023-12-27 PROCEDURE — 82306 VITAMIN D 25 HYDROXY: CPT

## 2023-12-27 PROCEDURE — 84439 ASSAY OF FREE THYROXINE: CPT

## 2023-12-27 PROCEDURE — 80053 COMPREHEN METABOLIC PANEL: CPT

## 2023-12-27 PROCEDURE — 84443 ASSAY THYROID STIM HORMONE: CPT

## 2024-01-03 ENCOUNTER — OFFICE VISIT (OUTPATIENT)
Dept: ENDOCRINOLOGY | Facility: CLINIC | Age: 69
End: 2024-01-03
Payer: MEDICARE

## 2024-01-03 VITALS
HEIGHT: 64 IN | WEIGHT: 160 LBS | OXYGEN SATURATION: 95 % | HEART RATE: 84 BPM | DIASTOLIC BLOOD PRESSURE: 72 MMHG | SYSTOLIC BLOOD PRESSURE: 109 MMHG | BODY MASS INDEX: 27.31 KG/M2

## 2024-01-03 DIAGNOSIS — L65.9 ALOPECIA: ICD-10-CM

## 2024-01-03 DIAGNOSIS — E89.0 HYPOTHYROIDISM, POSTSURGICAL: Primary | ICD-10-CM

## 2024-01-03 DIAGNOSIS — E55.9 VITAMIN D DEFICIENCY: ICD-10-CM

## 2024-01-03 PROCEDURE — 99214 OFFICE O/P EST MOD 30 MIN: CPT | Performed by: INTERNAL MEDICINE

## 2024-01-03 PROCEDURE — 1160F RVW MEDS BY RX/DR IN RCRD: CPT | Performed by: INTERNAL MEDICINE

## 2024-01-03 PROCEDURE — 1159F MED LIST DOCD IN RCRD: CPT | Performed by: INTERNAL MEDICINE

## 2024-01-03 RX ORDER — SPIRONOLACTONE 100 MG/1
200 TABLET, FILM COATED ORAL DAILY
Qty: 180 TABLET | Refills: 3 | Status: SHIPPED | OUTPATIENT
Start: 2024-01-03

## 2024-01-03 RX ORDER — LEVOTHYROXINE SODIUM 0.1 MG/1
100 TABLET ORAL DAILY
Qty: 90 TABLET | Refills: 3 | Status: SHIPPED | OUTPATIENT
Start: 2024-01-03

## 2024-01-03 NOTE — PATIENT INSTRUCTIONS
Continue exercise.  Continue spironolactone.  Decrease levothyroxine to one tab 6d / wk until finished.  Then take 100 mcg one tab daily.  F/u in 1y, with labs prior.

## 2024-01-07 NOTE — PROGRESS NOTES
[facility]        Patient Care Team:  Sarahi Johnson APRN as PCP - General (Nurse Practitioner)    Chief Complaint:    Chief Complaint   Patient presents with    Hypothyroidism     FU / Hypothyroidism          Subjective     Here for thyroid f/u  Taking levothyroxine 1112 mcg regularly  Exercise program: walking some  Taking vit D    Interval History:     Patient Comments: R foot surg in [place]  Patient Denies: bloating  History taken from: patient    Review of Systems:   Review of Systems   HENT:  Negative for trouble swallowing.    Eyes:  Negative for blurred vision.   Cardiovascular:  Positive for palpitations.   Gastrointestinal:  Negative for nausea.   Endocrine: Negative for polyuria.   Neurological:  Positive for tremors. Negative for headache.   Gained 5 lb since last visit  Objective     Vital Signs     Vitals:    01/03/24 0844   BP: 109/72   Pulse: 84   SpO2: 95%         Physical Exam:     General Appearance:    Alert, cooperative, in no acute distress   Head:    Normocephalic, without obvious abnormality, atraumatic   Eyes:       Mouth:  Neck:       No periorbital edema. No lid lag    No lesions    Scar from thyroidectomy   Lungs:     Clear     Heart:    Regular rhythm and normal rate   Abdomen:     Normal bowel sounds, soft                 Extremities:   Moves all extremities well, no edema or tremors               Pulses:   Pulses palpable and equal bilaterally   Skin:   Dry   Neurologic:  DTR 1+          Results Review:    I have reviewed the patient's new clinical results, labs & imaging.    Medication Review:   Prior to Admission medications    Medication Sig Start Date End Date Taking? Authorizing Provider   Biotin 2500 MCG capsule Take 1 capsule by mouth Daily. 9/27/17  Yes ProviderEvelin MD   Calcium Carbonate-Vitamin D (Calcium 500 + D) 500-125 MG-UNIT tablet    Yes ProviderEvelin MD   cetirizine (zyrTEC) 10 MG tablet Take 1 tablet by mouth Daily.   Yes  ProviderEvelin MD   fluticasone (FLONASE) 50 MCG/ACT nasal spray As Needed. 10/7/19  Yes Evelin Simon MD   Linzess 290 MCG capsule capsule  11/10/21  Yes Evelin Simon MD   meloxicam (MOBIC) 15 MG tablet Take 1 tablet by mouth Daily. 11/28/19  Yes Evelin Simon MD   multivitamin with minerals tablet tablet    Yes Evelin Simon MD   omeprazole (priLOSEC) 20 MG capsule Daily. 10/20/15  Yes Evelin Simon MD   rosuvastatin (CRESTOR) 10 MG tablet  10/27/21  Yes ProviderEvelin MD   spironolactone (ALDACTONE) 100 MG tablet Take 2 tablets by mouth Daily. 1/3/24  Yes Rama Mercer MD   tiZANidine (ZANAFLEX) 2 MG tablet  10/27/21  Yes Evelin Simon MD   traMADol (ULTRAM) 50 MG tablet Take 1 tablet by mouth As Needed. for pain 10/15/19  Yes Evelin Simon MD   valsartan-hydrochlorothiazide (DIOVAN-HCT) 160-12.5 MG per tablet Take 1 tablet by mouth Daily. 11/28/19  Yes Evelin Simon MD   vitamin D3 (vitamin d) 125 MCG (5000 UT) capsule capsule Daily. 10/20/15  Yes Evelin Simon MD   levothyroxine (SYNTHROID, LEVOTHROID) 100 MCG tablet Take 1 tablet by mouth Daily. 1/3/24   Rama Mercer MD         Lab Results   Component Value Date    GLUCOSE 90 12/27/2023    BUN 18 12/27/2023    CREATININE 1.05 (H) 12/27/2023    EGFRIFNONA 51 (L) 12/01/2021    BCR 17.1 12/27/2023    K 4.3 12/27/2023    CO2 24.2 12/27/2023    CALCIUM 10.0 12/27/2023    ALBUMIN 4.6 12/27/2023    LABIL2 1.4 03/07/2018    AST 26 12/27/2023    ALT 25 12/27/2023     Lab Results   Component Value Date    TSH 3.800 12/27/2023    FREET4 2.29 (H) 12/27/2023     Vit D level 48.2    Assessment & Plan     Diagnoses and all orders for this visit:    1. Hypothyroidism, postsurgical (Primary)  -     T4, Free; Future  -     TSH; Future  -     levothyroxine (SYNTHROID, LEVOTHROID) 100 MCG tablet; Take 1 tablet by mouth Daily.  Dispense: 90 tablet; Refill: 3    2. Vitamin D  deficiency  -     Vitamin D,25-Hydroxy; Future    3. Alopecia  -     Comprehensive Metabolic Panel; Future  -     spironolactone (ALDACTONE) 100 MG tablet; Take 2 tablets by mouth Daily.  Dispense: 180 tablet; Refill: 3    Thyroid over treated. Alopecia & vit D stable.      Continue exercise.  Continue spironolactone.  Decrease levothyroxine to one tab 6d / wk until finished.  Then take 100 mcg one tab daily.      Rama Mercer MD  01/06/24  19:06 EST

## 2024-12-14 DIAGNOSIS — E89.0 HYPOTHYROIDISM, POSTSURGICAL: ICD-10-CM

## 2024-12-16 RX ORDER — LEVOTHYROXINE SODIUM 100 UG/1
100 TABLET ORAL DAILY
Qty: 90 TABLET | Refills: 0 | Status: SHIPPED | OUTPATIENT
Start: 2024-12-16

## 2025-01-14 LAB
25(OH)D3+25(OH)D2 SERPL-MCNC: 32.2 NG/ML (ref 30–100)
ALBUMIN SERPL-MCNC: 4.6 G/DL (ref 3.9–4.9)
ALP SERPL-CCNC: 65 IU/L (ref 44–121)
ALT SERPL-CCNC: 21 IU/L (ref 0–32)
AMBIG ABBREV CMP14 DEFAULT: NORMAL
AST SERPL-CCNC: 28 IU/L (ref 0–40)
BILIRUB SERPL-MCNC: 0.8 MG/DL (ref 0–1.2)
BUN SERPL-MCNC: 22 MG/DL (ref 8–27)
BUN/CREAT SERPL: 20 (ref 12–28)
CALCIUM SERPL-MCNC: 9.2 MG/DL (ref 8.7–10.3)
CHLORIDE SERPL-SCNC: 99 MMOL/L (ref 96–106)
CO2 SERPL-SCNC: 21 MMOL/L (ref 20–29)
CREAT SERPL-MCNC: 1.1 MG/DL (ref 0.57–1)
EGFRCR SERPLBLD CKD-EPI 2021: 54 ML/MIN/1.73
GLOBULIN SER CALC-MCNC: 2.1 G/DL (ref 1.5–4.5)
GLUCOSE SERPL-MCNC: 83 MG/DL (ref 70–99)
POTASSIUM SERPL-SCNC: 4.4 MMOL/L (ref 3.5–5.2)
PROT SERPL-MCNC: 6.7 G/DL (ref 6–8.5)
SODIUM SERPL-SCNC: 136 MMOL/L (ref 134–144)
T4 FREE SERPL-MCNC: 1.89 NG/DL (ref 0.82–1.77)
TSH SERPL DL<=0.005 MIU/L-ACNC: 4.64 UIU/ML (ref 0.45–4.5)

## 2025-02-22 DIAGNOSIS — L65.9 ALOPECIA: ICD-10-CM

## 2025-02-24 RX ORDER — SPIRONOLACTONE 100 MG/1
200 TABLET, FILM COATED ORAL DAILY
Qty: 180 TABLET | Refills: 0 | Status: SHIPPED | OUTPATIENT
Start: 2025-02-24

## 2025-03-10 ENCOUNTER — OFFICE VISIT (OUTPATIENT)
Dept: ENDOCRINOLOGY | Facility: CLINIC | Age: 70
End: 2025-03-10
Payer: MEDICARE

## 2025-03-10 VITALS
DIASTOLIC BLOOD PRESSURE: 60 MMHG | WEIGHT: 160 LBS | OXYGEN SATURATION: 94 % | HEIGHT: 64 IN | HEART RATE: 69 BPM | SYSTOLIC BLOOD PRESSURE: 100 MMHG | BODY MASS INDEX: 27.31 KG/M2

## 2025-03-10 DIAGNOSIS — E66.3 OVERWEIGHT: ICD-10-CM

## 2025-03-10 DIAGNOSIS — Z85.850 HX OF THYROID CANCER: ICD-10-CM

## 2025-03-10 DIAGNOSIS — E89.0 HYPOTHYROIDISM, POSTSURGICAL: Primary | ICD-10-CM

## 2025-03-10 DIAGNOSIS — L65.9 ALOPECIA: ICD-10-CM

## 2025-03-10 PROCEDURE — 1160F RVW MEDS BY RX/DR IN RCRD: CPT | Performed by: INTERNAL MEDICINE

## 2025-03-10 PROCEDURE — 99214 OFFICE O/P EST MOD 30 MIN: CPT | Performed by: INTERNAL MEDICINE

## 2025-03-10 PROCEDURE — G2211 COMPLEX E/M VISIT ADD ON: HCPCS | Performed by: INTERNAL MEDICINE

## 2025-03-10 PROCEDURE — 1159F MED LIST DOCD IN RCRD: CPT | Performed by: INTERNAL MEDICINE

## 2025-03-10 RX ORDER — AZELASTINE HYDROCHLORIDE 137 UG/1
SPRAY, METERED NASAL
COMMUNITY
Start: 2025-01-31

## 2025-03-10 RX ORDER — SPIRONOLACTONE 100 MG/1
200 TABLET, FILM COATED ORAL DAILY
Qty: 180 TABLET | Refills: 1 | Status: SHIPPED | OUTPATIENT
Start: 2025-03-10

## 2025-03-10 RX ORDER — LEVOTHYROXINE SODIUM 100 UG/1
100 TABLET ORAL DAILY
Qty: 30 TABLET | Refills: 0 | Status: SHIPPED | OUTPATIENT
Start: 2025-03-10

## 2025-03-10 RX ORDER — ALENDRONATE SODIUM 70 MG/1
1 TABLET ORAL WEEKLY
COMMUNITY

## 2025-03-10 NOTE — PROGRESS NOTES
-----------------------------------------------------------------  ENDOCRINE CLINIC NOTE  -----------------------------------------------------------------        PATIENT NAME: Leslie Serrano  PATIENT : 1955 AGE: 69 y.o.  MRN NUMBER: [id number]  PRIMARY CARE: Sarahi Johnson APRN    ==========================================================================    CHIEF COMPLAINT: Postsurgical hypothyroidism  DATE OF SERVICE: 03/10/25    ==========================================================================    HPI / SUBJECTIVE    69 y.o. female is seen in the clinic today for postsurgical hypothyroidism.  Patient have previously seen Dr. Mercer.  Had thyroidectomy  - Papillary thyroid cancer, surgery in [facility] [place] at [facility].  Currently taking levothyroxine 100 mcg PO Daily.  Currently taking multiple OTC supplements.  Was previously on levothyroxine 125 mcg PO Daily.  Last endocrine visit was in 2024.   Alopecia, on spironolactone therapy.    ==========================================================================                                                PAST MEDICAL HISTORY    Past Medical History:   Diagnosis Date    Arthritis     Back pain     Hyperlipidemia     Hypertension     Hypothyroidism     Thyroid cancer 2004    Thyroid nodule 2004    Vitamin D deficiency        ==========================================================================    PAST SURGICAL HISTORY    Past Surgical History:   Procedure Laterality Date    BACK SURGERY      2018    BREAST LUMPECTOMY      CHOLECYSTECTOMY      FOOT SURGERY  2023    HYSTERECTOMY      TEMPOROMANDIBULAR JOINT ARTHROPLASTY      THYROID SURGERY  2004       ==========================================================================    FAMILY HISTORY    Family History   Problem Relation Age of Onset    Thyroid disease Father     Hypertension Father     Obesity Father     Diabetes Father     Cancer Maternal Aunt      Cancer Mother         Pancreatic cancer    Cancer Sister         Pancreatic csncer    Cancer Brother         Prostrate cancer       ==========================================================================    SOCIAL HISTORY    Social History     Socioeconomic History    Marital status:    Tobacco Use    Smoking status: Former     Current packs/day: 0.00     Types: Cigarettes     Start date: 1978     Quit date: 1978     Years since quittin.2    Smokeless tobacco: Former   Vaping Use    Vaping status: Never Used   Substance and Sexual Activity    Alcohol use: Not Currently     Comment: occasionally    Drug use: Never    Sexual activity: Yes     Partners: Male     Birth control/protection: Hysterectomy       ==========================================================================    MEDICATIONS      Current Outpatient Medications:     alendronate (FOSAMAX) 70 MG tablet, Take 1 tablet by mouth 1 (One) Time Per Week., Disp: , Rfl:     Azelastine HCl 137 MCG/SPRAY solution, use 2 spray(s) in each nostril twice daily, Disp: , Rfl:     Biotin 2500 MCG capsule, Take 1 capsule by mouth Daily., Disp: , Rfl:     Calcium Carbonate-Vitamin D (Calcium 500 + D) 500-125 MG-UNIT tablet, , Disp: , Rfl:     fluticasone (FLONASE) 50 MCG/ACT nasal spray, As Needed., Disp: , Rfl: 3    Levocetirizine Dihydrochloride (XYZAL ALLERGY 24HR PO), Take  by mouth., Disp: , Rfl:     Linzess 290 MCG capsule capsule, , Disp: , Rfl:     meloxicam (MOBIC) 15 MG tablet, Take 1 tablet by mouth Daily., Disp: , Rfl: 2    multivitamin with minerals tablet tablet, , Disp: , Rfl:     omeprazole (priLOSEC) 20 MG capsule, Daily., Disp: , Rfl:     rosuvastatin (CRESTOR) 10 MG tablet, , Disp: , Rfl:     tiZANidine (ZANAFLEX) 2 MG tablet, , Disp: , Rfl:     traMADol (ULTRAM) 50 MG tablet, Take 1 tablet by mouth As Needed. for pain, Disp: , Rfl: 0    valsartan-hydrochlorothiazide (DIOVAN-HCT) 160-12.5 MG per tablet, Take 1 tablet by mouth  "Daily., Disp: , Rfl: 2    vitamin D3 (vitamin d) 125 MCG (5000 UT) capsule capsule, Daily., Disp: , Rfl:     cetirizine (zyrTEC) 10 MG tablet, Take 1 tablet by mouth Daily. (Patient not taking: Reported on 3/10/2025), Disp: , Rfl:     levothyroxine (SYNTHROID, LEVOTHROID) 100 MCG tablet, Take 1 tablet by mouth Daily., Disp: 30 tablet, Rfl: 0    spironolactone (ALDACTONE) 100 MG tablet, Take 2 tablets by mouth Daily., Disp: 180 tablet, Rfl: 1    ==========================================================================    ALLERGIES    Allergies   Allergen Reactions    Morphine Rash       ==========================================================================    OBJECTIVE    Vitals:    03/10/25 0754   BP: 100/60   Pulse: 69   SpO2: 94%     Body mass index is 27.46 kg/m².     General: Alert, cooperative, no acute distress  Thyroid:  no enlargement/tenderness/palpable nodules  Lungs: Clear to auscultation bilaterally, respirations unlabored  Heart: Regular rate and rhythm, S1 and S2 normal, no murmur, rub or gallop  Abdomen: Soft, NT, ND and Bowel sounds Positive  Extremities:  Extremities normal, atraumatic, no cyanosis or edema    ==========================================================================    LAB EVALUATION    Lab Results   Component Value Date    GLUCOSE 83 01/13/2025    BUN 22 01/13/2025    CREATININE 1.10 (H) 01/13/2025    EGFRIFNONA 51 (L) 12/01/2021    BCR 20 01/13/2025    K 4.4 01/13/2025    CO2 21 01/13/2025    CALCIUM 9.2 01/13/2025    ALBUMIN 4.6 01/13/2025    LABIL2 1.4 03/07/2018    AST 28 01/13/2025    ALT 21 01/13/2025     No results found for: \"HGBA1C\"  Lab Results   Component Value Date    CREATININE 1.10 (H) 01/13/2025     Lab Results   Component Value Date    TSH 4.640 (H) 01/13/2025    FREET4 1.89 (H) 01/13/2025       ==========================================================================    ASSESSMENT AND PLAN    # Post surgical hypothyroidism  # Overweight with BMI of 27.46  # " History of thyroid cancer in 2004 status post surgical care    -Reviewed blood work results  - Patient blood work is showing both elevated TSH and free T4, this is most likely secondary to supplements that she is using over-the-counter  - Will repeat TSH and free T4 but free T4 will dialysis to review proper T4 results  - Will continue 100 mcg for now and will adjust therapy as per blood work results  - Will place neck set of blood work depending on to the blood work results either every 2 months or either every 3 months    # Alopecia, on spironolactone therapy, patient reports to have improvement, if patient needs further help will refer patient to dermatology for evaluation, this was discussed with patient in detail to which she verbalized understanding    Thank you for courtesy of consultation.    Return to clinic: 6 months    Entire assessment and plan was discussed and counseled the patient in detail to which patient verbalized understanding and agreed with care.  Answered all queries and concerns.    Part of this note may be an electronic transcription/translation of spoken language to printed text using the Dragon Dictation System.     Note: Portions of this note may have been copied from previous notes but documentation have been reviewed and edited as necessary to support clinical decision making for today's visit.    ==========================================================================    INFORMATION PROVIDED TO PATIENT    Patient Instructions   Please,    Continue levothyroxine 100 mcg 1 pill by mouth daily.  Please get blood work done today.  Continue spironolactone therapy.    Will review her blood work results and adjust therapy accordingly and we will also get neck set of blood work depending on to the blood work results.    Thank you for your visit today.    If you have any questions or concerns please feel free to reach out of the office.        ==========================================================================  Jef Moore MD  Department of Endocrine, Diabetes and Metabolism  [facility]  [place], [place]  ==========================================================================

## 2025-03-10 NOTE — PATIENT INSTRUCTIONS
Please,    Continue levothyroxine 100 mcg 1 pill by mouth daily.  Please get blood work done today.  Continue spironolactone therapy.    Will review her blood work results and adjust therapy accordingly and we will also get neck set of blood work depending on to the blood work results.    Thank you for your visit today.    If you have any questions or concerns please feel free to reach out of the office.

## 2025-03-12 ENCOUNTER — PATIENT ROUNDING (BHMG ONLY) (OUTPATIENT)
Dept: ENDOCRINOLOGY | Facility: CLINIC | Age: 70
End: 2025-03-12
Payer: MEDICARE

## 2025-03-12 NOTE — PROGRESS NOTES
March 12, 2025    Hello, may I speak with Leslie Serrano?    My name is nabor      I am  with [facility]  [address] [place] [id number].    Before we get started may I verify your date of birth? 1955    I am calling to officially welcome you to our practice and ask about your recent visit. Is this a good time to talk? yes    Tell me about your visit with us. What things went well?  great!!       We're always looking for ways to make our patients' experiences even better. Do you have recommendations on ways we may improve?  no    Overall were you satisfied with your first visit to our practice? yes       I appreciate you taking the time to speak with me today. Is there anything else I can do for you? no      Thank you, and have a great day.

## 2025-03-17 DIAGNOSIS — E89.0 HYPOTHYROIDISM, POSTSURGICAL: ICD-10-CM

## 2025-03-17 LAB
T4 FREE SERPL DIALY-MCNC: 1.3 NG/DL
TSH SERPL DL<=0.005 MIU/L-ACNC: 5.51 UIU/ML (ref 0.45–4.5)

## 2025-03-17 RX ORDER — LEVOTHYROXINE SODIUM 112 UG/1
112 TABLET ORAL DAILY
Qty: 90 TABLET | Refills: 1 | Status: SHIPPED | OUTPATIENT
Start: 2025-03-17 | End: 2025-09-13

## 2025-03-17 NOTE — TELEPHONE ENCOUNTER
Caller: Leslie Serrano    Relationship: Self    Best call back number: [phone]    Which medication are you concerned about: LEVOTHYROXINE    Who prescribed you this medication: DR ANGEL    What are your concerns: PATIENT STATES WAS SENT TO [facility]. NEEDS TO GO TO [facility].

## 2025-06-03 ENCOUNTER — LAB (OUTPATIENT)
Dept: ENDOCRINOLOGY | Facility: CLINIC | Age: 70
End: 2025-06-03
Payer: MEDICARE

## 2025-06-03 DIAGNOSIS — E89.0 HYPOTHYROIDISM, POSTSURGICAL: ICD-10-CM

## 2025-06-15 LAB
T4 FREE SERPL DIALY-MCNC: 1.7 NG/DL
TSH SERPL DL<=0.005 MIU/L-ACNC: 1.55 UIU/ML (ref 0.45–4.5)

## 2025-07-02 DIAGNOSIS — E89.0 HYPOTHYROIDISM, POSTSURGICAL: ICD-10-CM

## 2025-07-02 RX ORDER — LEVOTHYROXINE SODIUM 112 UG/1
112 TABLET ORAL DAILY
Qty: 90 TABLET | Refills: 1 | Status: SHIPPED | OUTPATIENT
Start: 2025-07-02

## 2025-08-27 ENCOUNTER — LAB (OUTPATIENT)
Dept: ENDOCRINOLOGY | Facility: CLINIC | Age: 70
End: 2025-08-27
Payer: MEDICARE

## 2025-08-27 DIAGNOSIS — E89.0 HYPOTHYROIDISM, POSTSURGICAL: ICD-10-CM
